# Patient Record
Sex: MALE | Race: WHITE
[De-identification: names, ages, dates, MRNs, and addresses within clinical notes are randomized per-mention and may not be internally consistent; named-entity substitution may affect disease eponyms.]

---

## 2019-11-20 ENCOUNTER — HOSPITAL ENCOUNTER (EMERGENCY)
Dept: HOSPITAL 74 - JERFT | Age: 44
Discharge: HOME | End: 2019-11-20
Payer: SELF-PAY

## 2019-11-20 VITALS — SYSTOLIC BLOOD PRESSURE: 107 MMHG | TEMPERATURE: 98.8 F | DIASTOLIC BLOOD PRESSURE: 62 MMHG | HEART RATE: 88 BPM

## 2019-11-20 VITALS — BODY MASS INDEX: 25 KG/M2

## 2019-11-20 DIAGNOSIS — L03.811: Primary | ICD-10-CM

## 2019-11-20 DIAGNOSIS — N17.9: ICD-10-CM

## 2019-11-20 LAB
ALBUMIN SERPL-MCNC: 3.7 G/DL (ref 3.4–5)
ALP SERPL-CCNC: 68 U/L (ref 45–117)
ALT SERPL-CCNC: 31 U/L (ref 13–61)
ANION GAP SERPL CALC-SCNC: 5 MMOL/L (ref 8–16)
AST SERPL-CCNC: 22 U/L (ref 15–37)
BASOPHILS # BLD: 0.2 % (ref 0–2)
BILIRUB SERPL-MCNC: 0.5 MG/DL (ref 0.2–1)
BUN SERPL-MCNC: 18.5 MG/DL (ref 7–18)
CALCIUM SERPL-MCNC: 9.2 MG/DL (ref 8.5–10.1)
CHLORIDE SERPL-SCNC: 103 MMOL/L (ref 98–107)
CO2 SERPL-SCNC: 26 MMOL/L (ref 21–32)
CREAT SERPL-MCNC: 2.2 MG/DL (ref 0.55–1.3)
DEPRECATED RDW RBC AUTO: 14.5 % (ref 11.9–15.9)
EOSINOPHIL # BLD: 1.4 % (ref 0–4.5)
GLUCOSE SERPL-MCNC: 104 MG/DL (ref 74–106)
HCT VFR BLD CALC: 44.8 % (ref 35.4–49)
HGB BLD-MCNC: 15 GM/DL (ref 11.7–16.9)
LYMPHOCYTES # BLD: 3.6 % (ref 8–40)
MCH RBC QN AUTO: 32.3 PG (ref 25.7–33.7)
MCHC RBC AUTO-ENTMCNC: 33.5 G/DL (ref 32–35.9)
MCV RBC: 96.4 FL (ref 80–96)
MONOCYTES # BLD AUTO: 7.4 % (ref 3.8–10.2)
NEUTROPHILS # BLD: 87.4 % (ref 42.8–82.8)
PLATELET # BLD AUTO: 315 K/MM3 (ref 134–434)
PMV BLD: 7.1 FL (ref 7.5–11.1)
POTASSIUM SERPLBLD-SCNC: 4.5 MMOL/L (ref 3.5–5.1)
PROT SERPL-MCNC: 7.4 G/DL (ref 6.4–8.2)
RBC # BLD AUTO: 4.65 M/MM3 (ref 4–5.6)
SODIUM SERPL-SCNC: 135 MMOL/L (ref 136–145)
WBC # BLD AUTO: 11.8 K/MM3 (ref 4–10)

## 2019-11-20 PROCEDURE — 3E03329 INTRODUCTION OF OTHER ANTI-INFECTIVE INTO PERIPHERAL VEIN, PERCUTANEOUS APPROACH: ICD-10-PCS

## 2019-11-20 PROCEDURE — 3E0333Z INTRODUCTION OF ANTI-INFLAMMATORY INTO PERIPHERAL VEIN, PERCUTANEOUS APPROACH: ICD-10-PCS

## 2019-11-20 NOTE — PDOC
History of Present Illness





- General


Chief Complaint: Abscess Boil


Stated Complaint: ABCESS ON HEAD


Time Seen by Provider: 11/20/19 12:24


History Source: Patient





- History of Present Illness


Severity: Yes: severe





Past History





- Past Medical History


Allergies/Adverse Reactions: 


 Allergies











Allergy/AdvReac Type Severity Reaction Status Date / Time


 


No Known Allergies Allergy   Verified 11/20/19 12:09











Home Medications: 


Ambulatory Orders





Clindamycin [Cleocin -] 300 mg PO Q6HPO #28 capsule 11/20/19 


Ibuprofen [Motrin -] 800 mg PO Q6H #30 tablet 11/20/19 


Mupirocin Ointment [Bactroban] 1 applic TP TID #1 tube 11/20/19 








COPD: No


Hypercholesterolemia: Yes





- Psycho Social/Smoking Cessation Hx


Smoking History: Never smoked





**Review of Systems





- Review of Systems


Constitutional: Yes: Chills, Fever, Malaise


Integumentary: Yes: Erythema





*Physical Exam





- Vital Signs


 Last Vital Signs











Temp Pulse Resp BP Pulse Ox


 


 98 F   99 H  18   92/59 L  99 


 


 11/20/19 12:06  11/20/19 12:06  11/20/19 12:06  11/20/19 12:06  11/20/19 12:06














- Physical Exam


General Appearance: Yes: Appropriately Dressed, Mild Distress


HEENT: positive: Normal Voice


Neck: positive: Supple


Integumentary: positive: Dry, Warm, Other (erythematous papules and pustules 

mostly lcolated to frontal and L parietal scalp w/ significant ttp, no abscess 

seen or palpated)


Neurologic: positive: Fully Oriented, Alert, Normal Mood/Affect





ED Treatment Course





- LABORATORY


CBC & Chemistry Diagram: 


 11/20/19 13:57





 11/20/19 13:57





Medical Decision Making





- Medical Decision Making





11/20/19 14:06





44-year-old male, denies any past medical history, here with severe scalp pain, 

redness and noticed some purulent discharge over the past week.  No trauma or 

obvious inciting factors.  No history of same.  Had subjective fever and chills 

last night.  





see exam





Scalp cellulitis, possibly 2/2 folliculitis vs less likely impetigo


Non-toxic morales but hypotensive


-IVF


-IV abx


-pain control


-labs


-CTH r/o extension of infection as d/w Dr Thapa








11/20/19 15:39


Mild leukocytosis to 11.8.  Creatinine 2.2 with normal K. No history of prior 

kidney issues.  Patient has since been given 1 L of normal saline.  CT head 

negative.  Repeat vitals improved.  Will dc with po and top abx and have 

patient follow-up in ED for reassessment in 2 days as d/w Dr Thapa











Discharge





- Discharge Information


Problems reviewed: Yes


Clinical Impression/Diagnosis: 


 Cellulitis of scalp, ROBB (acute kidney injury)





Condition: Improved


Disposition: HOME





- Additional Discharge Information


Prescriptions: 


Clindamycin [Cleocin -] 300 mg PO Q6HPO #28 capsule


Ibuprofen [Motrin -] 800 mg PO Q6H #30 tablet


Mupirocin Ointment [Bactroban] 1 applic TP TID #1 tube





- Follow up/Referral





- Patient Discharge Instructions


Patient Printed Discharge Instructions:  Cellulitis


Additional Instructions: 


Take medications as prescribed and return to ER in 2 days for reassessment.


If symptoms worsen prior to then, return to ER immediately


Your kidney test was minimally elevated today to 2.2 today which may be from 

dehydration and most likely may improve with IV fluids we gave you


Continue to hydrate yourself and follow up with your PMD





- Post Discharge Activity


Work/Back to School Note:  Back to Work